# Patient Record
Sex: MALE | Race: WHITE | NOT HISPANIC OR LATINO
[De-identification: names, ages, dates, MRNs, and addresses within clinical notes are randomized per-mention and may not be internally consistent; named-entity substitution may affect disease eponyms.]

---

## 2023-03-24 PROBLEM — Z00.00 ENCOUNTER FOR PREVENTIVE HEALTH EXAMINATION: Status: ACTIVE | Noted: 2023-03-24

## 2023-03-28 ENCOUNTER — APPOINTMENT (OUTPATIENT)
Dept: OTOLARYNGOLOGY | Facility: CLINIC | Age: 53
End: 2023-03-28
Payer: COMMERCIAL

## 2023-03-28 ENCOUNTER — NON-APPOINTMENT (OUTPATIENT)
Age: 53
End: 2023-03-28

## 2023-03-28 VITALS
DIASTOLIC BLOOD PRESSURE: 69 MMHG | HEIGHT: 72 IN | BODY MASS INDEX: 30.48 KG/M2 | SYSTOLIC BLOOD PRESSURE: 113 MMHG | WEIGHT: 225 LBS | HEART RATE: 63 BPM

## 2023-03-28 DIAGNOSIS — J34.2 DEVIATED NASAL SEPTUM: ICD-10-CM

## 2023-03-28 DIAGNOSIS — Q31.8 OTHER CONGENITAL MALFORMATIONS OF LARYNX: ICD-10-CM

## 2023-03-28 DIAGNOSIS — H61.23 IMPACTED CERUMEN, BILATERAL: ICD-10-CM

## 2023-03-28 PROCEDURE — 31575 DIAGNOSTIC LARYNGOSCOPY: CPT | Mod: 79

## 2023-03-28 PROCEDURE — 99203 OFFICE O/P NEW LOW 30 MIN: CPT | Mod: 25

## 2023-03-28 NOTE — CONSULT LETTER
[Dear  ___] : Dear  [unfilled], [Consult Letter:] : I had the pleasure of evaluating your patient, [unfilled]. [Please see my note below.] : Please see my note below. [Consult Closing:] : Thank you very much for allowing me to participate in the care of this patient.  If you have any questions, please do not hesitate to contact me. [Sincerely,] : Sincerely, [FreeTextEntry1] : Cj Iyer MD FACS

## 2023-03-28 NOTE — PROCEDURE
[Complicated Symptoms] : complicated symptoms requiring more thorough examination than provided by mirror [de-identified] : Flexible Laryngoscopy:\par -clear to the nasopharynx\par -uvula looks normal\par -narrowing at level of the soft palate\par -uvula extends down BOT\par -lymphoid hyperplasia at BOT\par -small growth on right arytenoid\par -edema of the postcricoid, arytenoids and interarytenoids. \par -pooling in the post cricoid and the piriforms

## 2023-03-28 NOTE — HISTORY OF PRESENT ILLNESS
[de-identified] : Patient presents stating that for 2 years he has had trouble swallowing. He states he feels it at the BOT and the level of the larynx. He states it feels rough and like it is getting stuck going down. He states he feels like he chokes on the food and sometimes has to cough it up. Patient adds there was point where it got better because he changed what he was eating and lost weight but he states it has come back. He states he does feel like he has indigestion and heartburn. he denies ever having any test done for this issue

## 2023-03-28 NOTE — ASSESSMENT
[FreeTextEntry1] : Reviewed and reconciled medications, allergies, PMHx, PSHx, SocHx, FMHx \par \par Patient presents stating that for 2 years he has had trouble swallowing. He states he feels it at the BOT and the level of the larynx. He states it feels rough and like it is getting stuck going down. He states he feels like he chokes on the food and sometimes has to cough it up. Patient adds there was point where it got better because he changed what he was eating and lost weight but he states it has come back. He states he does feel like he has indigestion and heartburn. 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 \par -pooling in the post cricoid and the piriforms \par \par Plan: cerumen impactions removed bilaterally. Flexible Laryngoscopy. FEEST/Videostrobe ordered. Xray Esophagram ordered. FU after tests.

## 2023-03-28 NOTE — PHYSICAL EXAM
[Normal] : mucosa is normal [Midline] : trachea located in midline position [FreeTextEntry8] : cerumen impaction removed via curettage and irrigation [FreeTextEntry9] : cerumen impaction removed via curettage  [FreeTextEntry1] : -deviated septum, right greater than left [de-identified] : uvula slightly elongated and edematous  [de-identified] : class 1

## 2023-04-06 ENCOUNTER — APPOINTMENT (OUTPATIENT)
Dept: OTOLARYNGOLOGY | Facility: CLINIC | Age: 53
End: 2023-04-06
Payer: COMMERCIAL

## 2023-04-06 PROCEDURE — 92612 ENDOSCOPY SWALLOW (FEES) VID: CPT | Mod: GN

## 2023-04-06 PROCEDURE — 31579 LARYNGOSCOPY TELESCOPIC: CPT | Mod: GN

## 2023-04-06 NOTE — REASON FOR VISIT
[Initial] : initial visit for [FEES] : Fiberoptic Endoscopic Evaluation of Swallowing [Other: _____] : [unfilled]  [Spouse] : spouse

## 2023-04-11 NOTE — ASSESSMENT
[FreeTextEntry1] : Please see scanned document in chart for full report and images. \par \par Impressions: 1) Mild Pharyngeal Dysphagia with possible esophageal dysphagia component; 2) Dysphonia\par \par Recommendations:\par 1) Continue a Regular Solid / Thin Liquid diet\par 2) Safe Swallow Guidelines: Upright position (90 degrees) during/after meals for 30 minutes; Slow rate of intake; Small bites/sips; Alternate solids/liquids; Apply dry swallow in between intakes.\par 3) Maintain Reflux Precautions\par 4) Adherence to Vocal Hygiene parameters (e.g., increase hydration, avoid vocal abuse/over-use, avoid aggressive throat clearing/coughing, avoid environmental irritants, etc.)\par 5) A course of Voice / Swallow Therapy (CPT - 21827, 55487) is recommended 1x per week, for 6-8 weeks to maximize voice and swallow function.\par 6) Suggest further GI workup to rule an esophageal dysphagia component due to above-mentioned pharyngeal clearance deficits at the cricopharyngeus / pyriform sinus. Patient pending barium esophagram which was ordered by MD.\par 7) Follow up with referring MD as directed

## 2023-05-02 ENCOUNTER — APPOINTMENT (OUTPATIENT)
Dept: OTOLARYNGOLOGY | Facility: CLINIC | Age: 53
End: 2023-05-02
Payer: COMMERCIAL

## 2023-05-02 VITALS
WEIGHT: 225 LBS | BODY MASS INDEX: 30.48 KG/M2 | DIASTOLIC BLOOD PRESSURE: 75 MMHG | HEIGHT: 72 IN | HEART RATE: 71 BPM | SYSTOLIC BLOOD PRESSURE: 131 MMHG

## 2023-05-02 DIAGNOSIS — J38.7 OTHER DISEASES OF LARYNX: ICD-10-CM

## 2023-05-02 DIAGNOSIS — R13.12 DYSPHAGIA, OROPHARYNGEAL PHASE: ICD-10-CM

## 2023-05-02 DIAGNOSIS — K21.9 GASTRO-ESOPHAGEAL REFLUX DISEASE W/OUT ESOPHAGITIS: ICD-10-CM

## 2023-05-02 DIAGNOSIS — J38.4 EDEMA OF LARYNX: ICD-10-CM

## 2023-05-02 PROCEDURE — 99214 OFFICE O/P EST MOD 30 MIN: CPT

## 2023-05-02 NOTE — CONSULT LETTER
[Dear  ___] : Dear  [unfilled], [Courtesy Letter:] : I had the pleasure of seeing your patient, [unfilled], in my office today. [Please see my note below.] : Please see my note below. [Referral Closing:] : Thank you very much for seeing this patient.  If you have any questions, please do not hesitate to contact me. [Sincerely,] : Sincerely, [FreeTextEntry3] : Cj Iyer MD FACS\par

## 2023-05-02 NOTE — ASSESSMENT
[FreeTextEntry1] : Reviewed and reconciled medications, allergies, PMHx, PSHx, SocHx, FMHx.\par \par Video strobe 4/12/2023:\par tinning of right vocal cord\par hyperfunction\par right arytenoid crosses over to the left arytenoid\par residue cricopharyngeus and bilateral pyriforms \par Edema and erythema of the postcricoid, arytenoids and interarytenoids.\par Pooling in the pyriforms\par localized swelling on the medial aspect of the right corniculate tubercule\par \par Spent 50% of visit discussing test results.  \par \par \par Plan:\par Do barium swallow in 3 weeks even if patient is feeling better\par  Continue new diet. No more carbonated caffeine drinks

## 2023-05-02 NOTE — HISTORY OF PRESENT ILLNESS
[de-identified] : Patient presents stating that for 2 years he has had trouble swallowing. Presents for video strobe results. Patient states he has stopped drinking carbonated caffeine drinks and he has very minimal issues with his throat.  Patient states he no longer has food stuck in his throat after starting this new diet. Patient has an appointment with GI doctor.

## 2023-05-23 ENCOUNTER — APPOINTMENT (OUTPATIENT)
Dept: CARDIOLOGY | Facility: CLINIC | Age: 53
End: 2023-05-23
Payer: COMMERCIAL

## 2023-05-23 ENCOUNTER — NON-APPOINTMENT (OUTPATIENT)
Age: 53
End: 2023-05-23

## 2023-05-23 VITALS
BODY MASS INDEX: 30.48 KG/M2 | SYSTOLIC BLOOD PRESSURE: 110 MMHG | OXYGEN SATURATION: 95 % | DIASTOLIC BLOOD PRESSURE: 70 MMHG | HEART RATE: 78 BPM | HEIGHT: 72 IN | WEIGHT: 225 LBS

## 2023-05-23 DIAGNOSIS — J45.20 MILD INTERMITTENT ASTHMA, UNCOMPLICATED: ICD-10-CM

## 2023-05-23 PROCEDURE — 99204 OFFICE O/P NEW MOD 45 MIN: CPT | Mod: 25

## 2023-05-23 PROCEDURE — 93000 ELECTROCARDIOGRAM COMPLETE: CPT

## 2023-05-23 RX ORDER — ALBUTEROL SULFATE 90 UG/1
108 (90 BASE) INHALANT RESPIRATORY (INHALATION)
Qty: 1 | Refills: 6 | Status: ACTIVE | COMMUNITY
Start: 2023-05-23

## 2023-06-08 ENCOUNTER — TRANSCRIPTION ENCOUNTER (OUTPATIENT)
Age: 53
End: 2023-06-08

## 2023-06-08 ENCOUNTER — APPOINTMENT (OUTPATIENT)
Dept: CARDIOLOGY | Facility: CLINIC | Age: 53
End: 2023-06-08
Payer: COMMERCIAL

## 2023-06-08 PROCEDURE — 93306 TTE W/DOPPLER COMPLETE: CPT

## 2023-06-13 ENCOUNTER — APPOINTMENT (OUTPATIENT)
Dept: CARDIOLOGY | Facility: CLINIC | Age: 53
End: 2023-06-13
Payer: COMMERCIAL

## 2023-06-13 DIAGNOSIS — R07.89 OTHER CHEST PAIN: ICD-10-CM

## 2023-06-13 PROCEDURE — 93015 CV STRESS TEST SUPVJ I&R: CPT
